# Patient Record
Sex: MALE | Race: ASIAN | NOT HISPANIC OR LATINO | URBAN - METROPOLITAN AREA
[De-identification: names, ages, dates, MRNs, and addresses within clinical notes are randomized per-mention and may not be internally consistent; named-entity substitution may affect disease eponyms.]

---

## 2018-09-28 ENCOUNTER — OFFICE VISIT - HEALTHEAST (OUTPATIENT)
Dept: FAMILY MEDICINE | Facility: CLINIC | Age: 23
End: 2018-09-28

## 2018-09-28 DIAGNOSIS — M10.9 GOUTY ARTHRITIS: ICD-10-CM
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
## 2018-09-28 DIAGNOSIS — M25.462 KNEE EFFUSION, LEFT: ICD-10-CM

## 2018-09-28 DIAGNOSIS — F10.10 ALCOHOL ABUSE: ICD-10-CM

## 2018-09-28 LAB
ANION GAP SERPL CALCULATED.3IONS-SCNC: 14 MMOL/L (ref 5–18)
BUN SERPL-MCNC: 14 MG/DL (ref 8–22)
CALCIUM SERPL-MCNC: 10 MG/DL (ref 8.5–10.5)
CHLORIDE BLD-SCNC: 101 MMOL/L (ref 98–107)
CO2 SERPL-SCNC: 25 MMOL/L (ref 22–31)
CREAT SERPL-MCNC: 0.74 MG/DL (ref 0.7–1.3)
GFR SERPL CREATININE-BSD FRML MDRD: >60 ML/MIN/1.73M2
GLUCOSE BLD-MCNC: 65 MG/DL (ref 70–125)
POTASSIUM BLD-SCNC: 4.1 MMOL/L (ref 3.5–5)
SODIUM SERPL-SCNC: 140 MMOL/L (ref 136–145)
URATE SERPL-MCNC: 9.4 MG/DL (ref 3–8)

## 2018-09-28 RX ORDER — PREDNISONE 20 MG/1
40 TABLET ORAL DAILY
Qty: 10 TABLET | Refills: 0 | Status: SHIPPED | OUTPATIENT
Start: 2018-09-28

## 2018-09-28 ASSESSMENT — MIFFLIN-ST. JEOR: SCORE: 1723.36
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
## 2018-09-28 NOTE — ASSESSMENT & PLAN NOTE
Check uric acid, basic metabolic profile, handout on gout healthy diet, cut back on alcohol.  Follow-up 1 month, consider starting allopurinol.  Also at that time, provide refills of prednisone for attacks
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
## 2018-10-05 ENCOUNTER — COMMUNICATION - HEALTHEAST (OUTPATIENT)
Dept: FAMILY MEDICINE | Facility: CLINIC | Age: 23
End: 2018-10-05

## 2018-10-11 ENCOUNTER — COMMUNICATION - HEALTHEAST (OUTPATIENT)
Dept: FAMILY MEDICINE | Facility: CLINIC | Age: 23
End: 2018-10-11

## 2021-06-02 VITALS — HEIGHT: 63 IN | BODY MASS INDEX: 33.13 KG/M2 | WEIGHT: 187 LBS

## 2021-06-16 PROBLEM — M25.462 KNEE EFFUSION, LEFT: Status: ACTIVE | Noted: 2018-09-28

## 2021-06-16 PROBLEM — F10.10 ALCOHOL ABUSE: Status: ACTIVE | Noted: 2018-09-28

## 2021-06-16 PROBLEM — M25.561 RIGHT KNEE PAIN: Status: ACTIVE | Noted: 2018-06-08

## 2021-06-16 PROBLEM — M10.9 GOUTY ARTHRITIS: Status: ACTIVE | Noted: 2018-09-28
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
## 2021-11-12 ENCOUNTER — LAB REQUISITION (OUTPATIENT)
Dept: LAB | Facility: CLINIC | Age: 26
End: 2021-11-12
Payer: COMMERCIAL

## 2021-11-12 DIAGNOSIS — Z03.818 ENCOUNTER FOR OBSERVATION FOR SUSPECTED EXPOSURE TO OTHER BIOLOGICAL AGENTS RULED OUT: ICD-10-CM

## 2021-11-12 PROCEDURE — U0003 INFECTIOUS AGENT DETECTION BY NUCLEIC ACID (DNA OR RNA); SEVERE ACUTE RESPIRATORY SYNDROME CORONAVIRUS 2 (SARS-COV-2) (CORONAVIRUS DISEASE [COVID-19]), AMPLIFIED PROBE TECHNIQUE, MAKING USE OF HIGH THROUGHPUT TECHNOLOGIES AS DESCRIBED BY CMS-2020-01-R: HCPCS | Performed by: NURSE PRACTITIONER

## 2021-11-12 PROCEDURE — 87081 CULTURE SCREEN ONLY: CPT | Performed by: NURSE PRACTITIONER

## 2021-11-13 LAB — SARS-COV-2 RNA RESP QL NAA+PROBE: POSITIVE

## 2021-11-15 LAB — BACTERIA SPEC CULT: NORMAL
